# Patient Record
Sex: MALE | Race: OTHER | NOT HISPANIC OR LATINO | URBAN - METROPOLITAN AREA
[De-identification: names, ages, dates, MRNs, and addresses within clinical notes are randomized per-mention and may not be internally consistent; named-entity substitution may affect disease eponyms.]

---

## 2022-09-15 ENCOUNTER — HOSPITAL ENCOUNTER (EMERGENCY)
Facility: HOSPITAL | Age: 42
Discharge: HOME | End: 2022-09-15
Attending: EMERGENCY MEDICINE
Payer: COMMERCIAL

## 2022-09-15 VITALS
OXYGEN SATURATION: 100 % | DIASTOLIC BLOOD PRESSURE: 81 MMHG | RESPIRATION RATE: 12 BRPM | WEIGHT: 190 LBS | HEART RATE: 69 BPM | HEIGHT: 67 IN | SYSTOLIC BLOOD PRESSURE: 118 MMHG | BODY MASS INDEX: 29.82 KG/M2 | TEMPERATURE: 98.3 F

## 2022-09-15 DIAGNOSIS — R55 NEAR SYNCOPE: Primary | ICD-10-CM

## 2022-09-15 LAB
ATRIAL RATE: 63 BPM
P AXIS: -4 DEGREES
PR INTERVAL: 178 MS
QRS AXIS: -29 DEGREES
QRSD INTERVAL: 78 MS
QT INTERVAL: 392 MS
QTC INTERVAL: 401 MS
T WAVE AXIS: 22 DEGREES
VENTRICULAR RATE: 63 BPM

## 2022-09-15 PROCEDURE — 99284 EMERGENCY DEPT VISIT MOD MDM: CPT

## 2022-09-15 PROCEDURE — 93005 ELECTROCARDIOGRAM TRACING: CPT

## 2022-09-15 PROCEDURE — 99285 EMERGENCY DEPT VISIT HI MDM: CPT | Performed by: PHYSICIAN ASSISTANT

## 2022-09-15 PROCEDURE — 93010 ELECTROCARDIOGRAM REPORT: CPT | Performed by: INTERNAL MEDICINE

## 2022-09-15 NOTE — ED PROVIDER NOTES
History  Chief Complaint   Patient presents with    Dizziness     Pt brought to ER by [facility] after near syncopal episode after going from sitting to standing position  Pt denies CP, dyspnea, nausea, vomiting, diarrhea, numbness and tingling  Pt reports dizziness at time of episode but denies now  41-year-old male presents via EMS for evaluation after a reported near syncopal episode  Patient reports he was outside on the porch of the [facility] where he is residing for rehabilitation states he was about to smoke a cigarette and went from a sitting to a standing position and felt lightheaded  Patient reports he sat down again and reports the sensation resolved  Patient reports staff at the [place] [id number] called 911 for him  EMS reports on arrival the patient had no complaints had a blood sugar 106 and normal vital signs  Patient requested not to be transported to the ER as he states he feels fine, however the [facility] [address] required the patient to be transported, seen, and evaluated  The patient denies any exertional component to the near syncopal episode reports he did not syncopized fall or strike his head  Patient denies any illness prior to the near syncopal episode and reports no chest pain prior to or after the episode  Patient denies any hemoptysis, hematemesis or blood noted in the stool  Patient denies any complaints at this time is simply requesting discharge paperwork which allows him to return to the [facility] [address]  History provided by:  Patient  Dizziness  Severity:  Moderate  Timing:  Constant  Progression:  Resolved  Chronicity:  New  Relieved by:  Nothing  Worsened by:  Nothing  Ineffective treatments:  None tried  Associated symptoms: no chest pain, no nausea, no palpitations, no shortness of breath and no vomiting        None       History reviewed  No pertinent past medical history  History reviewed  No pertinent surgical history  History reviewed   No pertinent family history  I have reviewed and agree with the history as documented  E-Cigarette/Vaping     E-Cigarette/Vaping Substances     Social History     Tobacco Use    Smoking status: Current Every Day Smoker     Packs/day: 0 50     Types: Cigarettes    Smokeless tobacco: Current User   Substance Use Topics    Alcohol use: Never    Drug use: Never       Review of Systems   Constitutional: Negative for chills, fatigue and fever  HENT: Negative for congestion, ear pain, rhinorrhea and sore throat  Eyes: Negative for redness  Respiratory: Negative for chest tightness and shortness of breath  Cardiovascular: Negative for chest pain and palpitations  Gastrointestinal: Negative for abdominal pain, nausea and vomiting  Genitourinary: Negative for dysuria and hematuria  Musculoskeletal: Negative  Skin: Negative for rash  Neurological: Positive for light-headedness  Negative for dizziness, syncope and numbness  Physical Exam  Physical Exam  Vitals and nursing note reviewed  Constitutional:       Appearance: Normal appearance  He is well-developed  HENT:      Head: Normocephalic and atraumatic  Eyes:      General: No scleral icterus  Pupils: Pupils are equal, round, and reactive to light  Cardiovascular:      Rate and Rhythm: Normal rate and regular rhythm  Pulses: Normal pulses  Heart sounds: No murmur heard  Pulmonary:      Effort: Pulmonary effort is normal  No respiratory distress  Breath sounds: Normal breath sounds  No stridor  No rhonchi  Abdominal:      General: There is no distension  Palpations: There is no mass  Musculoskeletal:      Cervical back: Normal range of motion  Skin:     General: Skin is warm and dry  Capillary Refill: Capillary refill takes less than 2 seconds  Coloration: Skin is not jaundiced  Neurological:      Mental Status: He is alert and oriented to person, place, and time        Gait: Gait normal    Psychiatric: Mood and Affect: Mood normal          Vital Signs  ED Triage Vitals   Temperature Pulse Respirations Blood Pressure SpO2   09/15/22 1245 09/15/22 1242 09/15/22 1242 09/15/22 1242 09/15/22 1242   98 3 °F (36 8 °C) 64 18 118/81 100 %      Temp Source Heart Rate Source Patient Position - Orthostatic VS BP Location FiO2 (%)   09/15/22 1245 09/15/22 1242 09/15/22 1245 09/15/22 1245 --   Oral Monitor Lying Left arm       Pain Score       --                  Vitals:    09/15/22 1242 09/15/22 1245   BP: 118/81 118/81   Pulse: 64 69   Patient Position - Orthostatic VS:  Lying         Visual Acuity      ED Medications  Medications - No data to display    Diagnostic Studies  Results Reviewed     None                 No orders to display              Procedures  ECG 12 Lead Documentation Only    Date/Time: 9/15/2022 12:49 PM  Performed by: Michell Lundberg PA-C  Authorized by: Michell Lundberg PA-C     Indications / Diagnosis:  Near syncopal episode  ECG reviewed by me, the ED Provider: yes    Patient location:  ED  Previous ECG:     Comparison to cardiac monitor: Yes    Interpretation:     Interpretation: normal    Rate:     ECG rate:  63    ECG rate assessment: normal    Rhythm:     Rhythm: sinus rhythm    Ectopy:     Ectopy: none    QRS:     QRS axis:  Left    QRS intervals:  Normal  Conduction:     Conduction: normal    ST segments:     ST segments:  Normal  T waves:     T waves: normal    Comments:        QRS 78                 ED Course                                             MDM  Number of Diagnoses or Management Options  Near syncope  Diagnosis management comments: All imaging and/or lab testing discussed with patient, strict return to ED precautions discussed  Patient recommended to follow up promptly with appropriate outpatient provider  Patient and/or family members verbalizes understanding and agrees with plan   Patient is stable for discharge      Portions of the record may have been created with voice recognition software  Occasional wrong word or "sound a like" substitutions may have occurred due to the inherent limitations of voice recognition software  Read the chart carefully and recognize, using context, where substitutions have occurred  Disposition  Final diagnoses:   Near syncope     Time reflects when diagnosis was documented in both MDM as applicable and the Disposition within this note     Time User Action Codes Description Comment    9/15/2022 12:39 PM Ira Blevins Add [R55] Near syncope       ED Disposition     ED Disposition   Discharge    Condition   Good    Date/Time   Thu Sep 15, 2022 12:39 PM    Christie 169 discharge to home/self care  Follow-up Information     Follow up With Specialties Details Why Contact Info    Yunior Jacobs MD Family Medicine Schedule an appointment as soon as possible for a visit   [address] [place] [address]  [phone]            Patient's Medications    No medications on file       No discharge procedures on file      PDMP Review     None          ED Provider  Electronically Signed by           Lyndsay Vang PA-C  09/15/22 3107

## 2022-09-15 NOTE — DISCHARGE INSTRUCTIONS
Azeb Pabon is medically clear for discharge and may return to the [id number] [facility]    Testing in the emergency department is normal